# Patient Record
Sex: OTHER/UNKNOWN | URBAN - METROPOLITAN AREA
[De-identification: names, ages, dates, MRNs, and addresses within clinical notes are randomized per-mention and may not be internally consistent; named-entity substitution may affect disease eponyms.]

---

## 2023-09-12 ENCOUNTER — APPOINTMENT (OUTPATIENT)
Dept: URBAN - METROPOLITAN AREA SURGERY 12 | Age: 53
Setting detail: DERMATOLOGY
End: 2023-09-14

## 2023-09-12 DIAGNOSIS — L81.4 OTHER MELANIN HYPERPIGMENTATION: ICD-10-CM

## 2023-09-12 PROBLEM — D48.5 NEOPLASM OF UNCERTAIN BEHAVIOR OF SKIN: Status: ACTIVE | Noted: 2023-09-12

## 2023-09-12 PROCEDURE — OTHER BIOPSY BY SHAVE METHOD: OTHER

## 2023-09-12 PROCEDURE — 11102 TANGNTL BX SKIN SINGLE LES: CPT

## 2023-09-12 ASSESSMENT — LOCATION ZONE DERM: LOCATION ZONE: ARM

## 2023-09-12 ASSESSMENT — LOCATION SIMPLE DESCRIPTION DERM: LOCATION SIMPLE: RIGHT FOREARM

## 2023-09-12 ASSESSMENT — LOCATION DETAILED DESCRIPTION DERM: LOCATION DETAILED: RIGHT VENTRAL LATERAL DISTAL FOREARM

## 2023-10-02 ENCOUNTER — APPOINTMENT (OUTPATIENT)
Dept: URBAN - METROPOLITAN AREA SURGERY 11 | Age: 53
Setting detail: DERMATOLOGY
End: 2023-10-02

## 2023-10-02 DIAGNOSIS — D22 MELANOCYTIC NEVI: ICD-10-CM

## 2023-10-02 PROCEDURE — OTHER MOHS SURGERY PHONE CONSULTATION: OTHER

## 2023-10-02 ASSESSMENT — LOCATION ZONE DERM: LOCATION ZONE: ARM

## 2023-10-02 ASSESSMENT — LOCATION SIMPLE DESCRIPTION DERM: LOCATION SIMPLE: RIGHT FOREARM

## 2023-10-02 ASSESSMENT — LOCATION DETAILED DESCRIPTION DERM: LOCATION DETAILED: RIGHT VENTRAL LATERAL DISTAL FOREARM

## 2023-10-02 NOTE — PROCEDURE: MOHS SURGERY PHONE CONSULTATION
Has The Patient Ever Had A Joint Replaced?: No
Has The Pathology Report Been Received?: Yes
Patient Reported Location: Right Ventral Lateral Distal Forearm
Detail Level: Simple
Date Of Surgical Consultation If Needed: 10/02/2023
Date Of Mohs Surgery: 10/04/2023
Time Of Mohs Surgery: 1:00
Which Antibiotic Do They Take For Surgical Prophylaxis?: Amoxicillin (2 grams)

## 2023-10-04 ENCOUNTER — APPOINTMENT (OUTPATIENT)
Dept: URBAN - METROPOLITAN AREA SURGERY 11 | Age: 53
Setting detail: DERMATOLOGY
End: 2023-10-04

## 2023-10-04 DIAGNOSIS — D485 NEOPLASM OF UNCERTAIN BEHAVIOR OF SKIN: ICD-10-CM

## 2023-10-04 PROBLEM — D48.5 NEOPLASM OF UNCERTAIN BEHAVIOR OF SKIN: Status: ACTIVE | Noted: 2023-10-04

## 2023-10-04 PROCEDURE — OTHER EXCISION: OTHER

## 2023-10-04 PROCEDURE — 12032 INTMD RPR S/A/T/EXT 2.6-7.5: CPT

## 2023-10-04 PROCEDURE — 11403 EXC TR-EXT B9+MARG 2.1-3CM: CPT

## 2023-10-04 PROCEDURE — OTHER MIPS QUALITY: OTHER

## 2023-10-04 ASSESSMENT — LOCATION SIMPLE DESCRIPTION DERM: LOCATION SIMPLE: RIGHT FOREARM

## 2023-10-04 ASSESSMENT — LOCATION DETAILED DESCRIPTION DERM: LOCATION DETAILED: RIGHT VENTRAL LATERAL DISTAL FOREARM

## 2023-10-04 ASSESSMENT — LOCATION ZONE DERM: LOCATION ZONE: ARM

## 2023-10-19 ENCOUNTER — APPOINTMENT (OUTPATIENT)
Dept: URBAN - METROPOLITAN AREA SURGERY 11 | Age: 53
Setting detail: DERMATOLOGY
End: 2023-10-19

## 2023-10-19 DIAGNOSIS — Z48.02 ENCOUNTER FOR REMOVAL OF SUTURES: ICD-10-CM

## 2023-10-19 PROCEDURE — 99024 POSTOP FOLLOW-UP VISIT: CPT

## 2023-10-19 PROCEDURE — OTHER SUTURE REMOVAL (GLOBAL PERIOD): OTHER

## 2023-10-19 PROCEDURE — OTHER PRESCRIPTION: OTHER

## 2023-10-19 RX ORDER — MUPIROCIN 20 MG/G
OINTMENT TOPICAL
Qty: 22 | Refills: 1 | Status: ERX | COMMUNITY
Start: 2023-10-19

## 2023-10-19 RX ORDER — DOXYCYCLINE HYCLATE 100 MG/1
CAPSULE, GELATIN COATED ORAL
Qty: 14 | Refills: 0 | Status: ERX | COMMUNITY
Start: 2023-10-19

## 2023-10-19 ASSESSMENT — LOCATION ZONE DERM: LOCATION ZONE: ARM

## 2023-10-19 ASSESSMENT — LOCATION DETAILED DESCRIPTION DERM: LOCATION DETAILED: RIGHT VENTRAL DISTAL FOREARM

## 2023-10-19 ASSESSMENT — LOCATION SIMPLE DESCRIPTION DERM: LOCATION SIMPLE: RIGHT FOREARM

## 2023-10-19 NOTE — PROCEDURE: SUTURE REMOVAL (GLOBAL PERIOD)
Detail Level: Detailed
Add 78800 Cpt? (Important Note: In 2017 The Use Of 51404 Is Being Tracked By Cms To Determine Future Global Period Reimbursement For Global Periods): yes
Bleeding that does not stop/Pain not relieved by Medications/Fever greater than (need to indicate Fahrenheit or Celsius)/Nausea and vomiting that does not stop/Unable to urinate/Inability to tolerate liquids or foods/Increased irritability or sluggishness

## 2023-11-02 ENCOUNTER — APPOINTMENT (OUTPATIENT)
Dept: URBAN - METROPOLITAN AREA SURGERY 11 | Age: 53
Setting detail: DERMATOLOGY
End: 2023-11-02

## 2023-11-02 DIAGNOSIS — Z48.817 ENCOUNTER FOR SURGICAL AFTERCARE FOLLOWING SURGERY ON THE SKIN AND SUBCUTANEOUS TISSUE: ICD-10-CM

## 2023-11-02 PROCEDURE — OTHER POST-OP WOUND CHECK: OTHER

## 2023-11-02 ASSESSMENT — LOCATION DETAILED DESCRIPTION DERM: LOCATION DETAILED: RIGHT VENTRAL DISTAL FOREARM

## 2023-11-02 ASSESSMENT — LOCATION SIMPLE DESCRIPTION DERM: LOCATION SIMPLE: RIGHT FOREARM

## 2023-11-02 ASSESSMENT — LOCATION ZONE DERM: LOCATION ZONE: ARM

## 2023-11-02 NOTE — PROCEDURE: POST-OP WOUND CHECK
Detail Level: Detailed
Add 39857 Cpt? (Important Note: In 2017 The Use Of 84390 Is Being Tracked By Cms To Determine Future Global Period Reimbursement For Global Periods): no
Additional Comments: Informed patient of clear margins on Excision. Much improved following Doxycycline and mupirocin.  Still with residual erythema. Recommend VBeam with Karen Nicole RN. Advised patient first treatment no charge, then $25 for any after that

## 2024-01-29 ENCOUNTER — APPOINTMENT (OUTPATIENT)
Dept: URBAN - METROPOLITAN AREA SURGERY 12 | Age: 54
Setting detail: DERMATOLOGY
End: 2024-01-29

## 2024-01-29 DIAGNOSIS — Z41.9 ENCOUNTER FOR PROCEDURE FOR PURPOSES OTHER THAN REMEDYING HEALTH STATE, UNSPECIFIED: ICD-10-CM

## 2024-01-29 PROCEDURE — OTHER VBEAM PERFECTA LASER: OTHER

## 2024-01-29 NOTE — PROCEDURE: VBEAM PERFECTA LASER
Is There A Second Setting?: no
Delay Time (Ms): 20
Skin Type (Optional): II
Pulse Duration: 1.5 ms
Post-Care Instructions: I reviewed with the patient in detail post-care instructions.  Cool compresses or cold gel packs may be applied after treatment.  Exposure to the sun should be avoided and sun protection and sunscreen should be used.
Pulse Duration: 10 ms
Delay Time (Ms): 0
Spray Time (Ms): 30
Pre-Procedure: The treatment areas identified by the patient were cleansed and treatment was performed with the parameters mentioned above.
Number Of Pulses: 24
Spot Size: 7 mm
Price (Use Numbers Only, No Special Characters Or $): 0.00
Fluence (J/Cm2): 7.5
Post-Procedure: Following the procedure the post care instructions were reviewed with the patient. Rec second treatment in 4 weeks, quoted $25 per additional treatment
Spot Size: 3 mm
Detail Level: Zone
Treatment Number: 1
Spot Size: 10 mm
Consent: Verbal consent obtained.  Risks were reviewed including but not limited to crusting, scabbing, blistering, scarring, darker or lighter pigmentary change, bruising, and/or incomplete response.
Spot Size: 3x10 mm
Location: R forearm

## 2024-02-27 ENCOUNTER — APPOINTMENT (OUTPATIENT)
Dept: URBAN - METROPOLITAN AREA SURGERY 12 | Age: 54
Setting detail: DERMATOLOGY
End: 2024-02-27

## 2024-02-27 DIAGNOSIS — Z41.9 ENCOUNTER FOR PROCEDURE FOR PURPOSES OTHER THAN REMEDYING HEALTH STATE, UNSPECIFIED: ICD-10-CM

## 2024-02-27 PROCEDURE — OTHER VBEAM PERFECTA LASER: OTHER

## 2024-02-27 NOTE — PROCEDURE: VBEAM PERFECTA LASER
Is There A Second Setting?: no
Delay Time (Ms): 20
Skin Type (Optional): II
Pulse Duration: 1.5 ms
Post-Care Instructions: I reviewed with the patient in detail post-care instructions.  Cool compresses or cold gel packs may be applied after treatment.  Exposure to the sun should be avoided and sun protection and sunscreen should be used.
Pulse Duration: 10 ms
Delay Time (Ms): 0
Spray Time (Ms): 30
Pre-Procedure: The treatment areas identified by the patient were cleansed and treatment was performed with the parameters mentioned above.
Number Of Pulses: 27
Spot Size: 7 mm
Price (Use Numbers Only, No Special Characters Or $): 25.00
Fluence (J/Cm2): 7.5
Post-Procedure: Following the procedure the post care instructions were reviewed with the patient.
Spot Size: 3 mm
Detail Level: Zone
Treatment Number: 2
Spot Size: 10 mm
Consent: Verbal consent obtained.  Risks were reviewed including but not limited to crusting, scabbing, blistering, scarring, darker or lighter pigmentary change, bruising, and/or incomplete response.
Spot Size: 3x10 mm
Location: R forearm

## 2024-03-25 ENCOUNTER — APPOINTMENT (OUTPATIENT)
Dept: URBAN - METROPOLITAN AREA SURGERY 12 | Age: 54
Setting detail: DERMATOLOGY
End: 2024-03-25

## 2024-03-25 DIAGNOSIS — Z41.9 ENCOUNTER FOR PROCEDURE FOR PURPOSES OTHER THAN REMEDYING HEALTH STATE, UNSPECIFIED: ICD-10-CM

## 2024-03-25 PROCEDURE — OTHER VBEAM PERFECTA LASER: OTHER

## 2024-03-25 NOTE — PROCEDURE: VBEAM PERFECTA LASER
Is There A Second Setting?: no
Delay Time (Ms): 20
Skin Type (Optional): II
Pulse Duration: 1.5 ms
Post-Care Instructions: I reviewed with the patient in detail post-care instructions.  Cool compresses or cold gel packs may be applied after treatment.  Exposure to the sun should be avoided and sun protection and sunscreen should be used.
Pulse Duration: 10 ms
Delay Time (Ms): 0
Spray Time (Ms): 30
Pre-Procedure: The treatment areas identified by the patient were cleansed and treatment was performed with the parameters mentioned above.
Number Of Pulses: 29
Spot Size: 7 mm
Price (Use Numbers Only, No Special Characters Or $): 25.00
Fluence (J/Cm2): 7.75
Post-Procedure: Following the procedure the post care instructions were reviewed with the patient.
Spot Size: 3 mm
Detail Level: Zone
Treatment Number: 3
Spot Size: 10 mm
Consent: Verbal consent obtained.  Risks were reviewed including but not limited to crusting, scabbing, blistering, scarring, darker or lighter pigmentary change, bruising, and/or incomplete response.
Spot Size: 3x10 mm
Location: R forearm

## 2024-04-22 ENCOUNTER — APPOINTMENT (OUTPATIENT)
Dept: URBAN - METROPOLITAN AREA SURGERY 12 | Age: 54
Setting detail: DERMATOLOGY
End: 2024-04-22

## 2024-04-22 DIAGNOSIS — Z41.9 ENCOUNTER FOR PROCEDURE FOR PURPOSES OTHER THAN REMEDYING HEALTH STATE, UNSPECIFIED: ICD-10-CM

## 2024-04-22 PROCEDURE — OTHER VBEAM PERFECTA LASER: OTHER

## 2024-04-22 NOTE — PROCEDURE: VBEAM PERFECTA LASER
Is There A Second Setting?: no
Delay Time (Ms): 20
Skin Type (Optional): II
Pulse Duration: 1.5 ms
Post-Care Instructions: I reviewed with the patient in detail post-care instructions.  Cool compresses or cold gel packs may be applied after treatment.  Exposure to the sun should be avoided and sun protection and sunscreen should be used.
Pulse Duration: 10 ms
Delay Time (Ms): 0
Spray Time (Ms): 30
Pre-Procedure: The treatment areas identified by the patient were cleansed and treatment was performed with the parameters mentioned above.
Number Of Pulses: 26
Spot Size: 7 mm
Price (Use Numbers Only, No Special Characters Or $): 25.00
Fluence (J/Cm2): 7.25
Post-Procedure: Following the procedure the post care instructions were reviewed with the patient.
Spot Size: 3 mm
Detail Level: Zone
Treatment Number: 3
Spot Size: 10 mm
Consent: Verbal consent obtained.  Risks were reviewed including but not limited to crusting, scabbing, blistering, scarring, darker or lighter pigmentary change, bruising, and/or incomplete response.
Spot Size: 3x10 mm
Location: R forearm